# Patient Record
Sex: FEMALE | Race: BLACK OR AFRICAN AMERICAN | Employment: FULL TIME | URBAN - METROPOLITAN AREA
[De-identification: names, ages, dates, MRNs, and addresses within clinical notes are randomized per-mention and may not be internally consistent; named-entity substitution may affect disease eponyms.]

---

## 2022-10-21 ENCOUNTER — APPOINTMENT (OUTPATIENT)
Dept: GENERAL RADIOLOGY | Age: 42
End: 2022-10-21
Payer: COMMERCIAL

## 2022-10-21 ENCOUNTER — HOSPITAL ENCOUNTER (EMERGENCY)
Age: 42
Discharge: HOME | End: 2022-10-21
Attending: EMERGENCY MEDICINE
Payer: COMMERCIAL

## 2022-10-21 VITALS
HEART RATE: 110 BPM | OXYGEN SATURATION: 98 % | SYSTOLIC BLOOD PRESSURE: 165 MMHG | WEIGHT: 158 LBS | RESPIRATION RATE: 19 BRPM | TEMPERATURE: 98.1 F | DIASTOLIC BLOOD PRESSURE: 94 MMHG

## 2022-10-21 DIAGNOSIS — R05.9 COUGH, UNSPECIFIED TYPE: Primary | ICD-10-CM

## 2022-10-21 DIAGNOSIS — J40 BRONCHITIS: ICD-10-CM

## 2022-10-21 DIAGNOSIS — R68.89 FLU-LIKE SYMPTOMS: ICD-10-CM

## 2022-10-21 LAB
RAPID INFLUENZA  B AGN: NEGATIVE
RAPID INFLUENZA A AGN: NEGATIVE
SARS-COV-2, NAAT: NOT DETECTED
SOURCE: NORMAL

## 2022-10-21 PROCEDURE — 87804 INFLUENZA ASSAY W/OPTIC: CPT

## 2022-10-21 PROCEDURE — 71045 X-RAY EXAM CHEST 1 VIEW: CPT

## 2022-10-21 PROCEDURE — 99284 EMERGENCY DEPT VISIT MOD MDM: CPT

## 2022-10-21 PROCEDURE — 87635 SARS-COV-2 COVID-19 AMP PRB: CPT

## 2022-10-21 PROCEDURE — 6370000000 HC RX 637 (ALT 250 FOR IP): Performed by: EMERGENCY MEDICINE

## 2022-10-21 RX ORDER — GUAIFENESIN/DEXTROMETHORPHAN 100-10MG/5
5 SYRUP ORAL 4 TIMES DAILY PRN
Qty: 120 ML | Refills: 0 | Status: SHIPPED | OUTPATIENT
Start: 2022-10-21 | End: 2022-10-31

## 2022-10-21 RX ORDER — ACETAMINOPHEN 500 MG
1000 TABLET ORAL ONCE
Status: COMPLETED | OUTPATIENT
Start: 2022-10-21 | End: 2022-10-21

## 2022-10-21 RX ORDER — ACETAMINOPHEN 325 MG/1
650 TABLET ORAL EVERY 6 HOURS PRN
Qty: 120 TABLET | Refills: 3 | Status: SHIPPED | OUTPATIENT
Start: 2022-10-21

## 2022-10-21 RX ORDER — BENZONATATE 100 MG/1
100 CAPSULE ORAL 3 TIMES DAILY PRN
Qty: 10 CAPSULE | Refills: 0 | Status: SHIPPED | OUTPATIENT
Start: 2022-10-21 | End: 2022-10-28

## 2022-10-21 RX ORDER — ALBUTEROL SULFATE 90 UG/1
2 AEROSOL, METERED RESPIRATORY (INHALATION) EVERY 4 HOURS PRN
Qty: 18 G | Refills: 1 | Status: SHIPPED | OUTPATIENT
Start: 2022-10-21 | End: 2022-11-20

## 2022-10-21 RX ORDER — BENZONATATE 100 MG/1
100 CAPSULE ORAL ONCE
Status: COMPLETED | OUTPATIENT
Start: 2022-10-21 | End: 2022-10-21

## 2022-10-21 RX ORDER — AZITHROMYCIN 250 MG/1
TABLET, FILM COATED ORAL
Qty: 1 PACKET | Refills: 0 | Status: SHIPPED | OUTPATIENT
Start: 2022-10-21 | End: 2022-10-31

## 2022-10-21 RX ORDER — NAPROXEN 500 MG/1
500 TABLET ORAL 2 TIMES DAILY
Qty: 60 TABLET | Refills: 0 | Status: SHIPPED | OUTPATIENT
Start: 2022-10-21

## 2022-10-21 RX ADMIN — GUAIFENESIN 200 MG: 200 SOLUTION ORAL at 21:06

## 2022-10-21 RX ADMIN — BENZONATATE 100 MG: 100 CAPSULE ORAL at 21:06

## 2022-10-21 RX ADMIN — ACETAMINOPHEN 1000 MG: 500 TABLET ORAL at 21:06

## 2022-10-21 ASSESSMENT — ENCOUNTER SYMPTOMS
EYES NEGATIVE: 1
COUGH: 1
ALLERGIC/IMMUNOLOGIC NEGATIVE: 1
GASTROINTESTINAL NEGATIVE: 1

## 2022-10-21 ASSESSMENT — PAIN SCALES - GENERAL: PAINLEVEL_OUTOF10: 0

## 2022-10-21 NOTE — Clinical Note
Soledad Trujillo was seen and treated in our emergency department on 10/21/2022. She may return to work on 10/24/2022. If you have any questions or concerns, please don't hesitate to call.       Chandni Sagastume, DO

## 2022-10-22 NOTE — ED PROVIDER NOTES
[facility]      TRIAGE CHIEF COMPLAINT:   Concern For COVID-19      [place]:  Foster Dillon is a 37 y.o. female that presents with complaint of cough congestion fever fatigue malaise myalgias chills. 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 She recently got her flu vaccine also had the COVID-vaccine and booster. She states last night she worked till 7 AM this morning and went home went to bed woke up feeling bad. She has not taken a COVID test or flu test she was worried about these things she has been around people with these illnesses. She has had fever cough congestion fatigue malaise myalgia. No chest pain except when coughing. Denies any heart problems or lung problems. She has not taken medication for her cough or other symptoms whatsoever. She denies other travel denies any pregnancy or abdominal pain or swelling no other questions or concerns. Cough is productive of colored sputum. REVIEW OF SYSTEMS:  At least 10 systems reviewed and otherwise acutely negative except as in the [address] Review of Systems   Constitutional:  Positive for activity change, appetite change, chills and fever. HENT:  Positive for congestion. Eyes: Negative. Respiratory:  Positive for cough. Cardiovascular: Negative. Gastrointestinal: Negative. Endocrine: Negative. Genitourinary: Negative. Musculoskeletal:  Positive for arthralgias and myalgias. Skin: Negative. Allergic/Immunologic: Negative. Neurological: Negative. Hematological: Negative. Psychiatric/Behavioral: Negative. All other systems reviewed and are negative. No past medical history on file. No past surgical history on file. No family history on file.   Social History     Socioeconomic History    Marital status: Single     Spouse name: Not on file    Number of children: Not on file    Years of education: Not on file    Highest education level: Not on file   Occupational History    Not on file Tobacco Use    Smoking status: Not on file    Smokeless tobacco: Not on file   Substance and Sexual Activity    Alcohol use: Not on file    Drug use: Not on file    Sexual activity: Not on file   Other Topics Concern    Not on file   Social History Narrative    Not on file     Social Determinants of Health     Financial Resource Strain: Not on file   Food Insecurity: Not on file   Transportation Needs: Not on file   Physical Activity: Not on file   Stress: Not on file   Social Connections: Not on file   Intimate Partner Violence: Not on file   Housing Stability: Not on file     No current facility-administered medications for this encounter. Current Outpatient Medications   Medication Sig Dispense Refill    albuterol sulfate HFA (PROVENTIL HFA) 108 (90 Base) MCG/ACT inhaler Inhale 2 puffs into the lungs every 4 hours as needed for Wheezing or Shortness of Breath With spacer (and mask if indicated). Thanks. 18 g 1    naproxen (NAPROSYN) 500 MG tablet Take 1 tablet by mouth 2 times daily 60 tablet 0    acetaminophen (TYLENOL) 325 MG tablet Take 2 tablets by mouth every 6 hours as needed for Pain 120 tablet 3    guaiFENesin-dextromethorphan (ROBITUSSIN DM) 100-10 MG/5ML syrup Take 5 mLs by mouth 4 times daily as needed for Cough 120 mL 0    benzonatate (TESSALON PERLES) 100 MG capsule Take 1 capsule by mouth 3 times daily as needed for Cough 10 capsule 0    azithromycin (ZITHROMAX) 250 MG tablet Take 2 tablets (500 mg) on Day 1, followed by 1 tablet (250 mg) once daily on Days 2 through 5. 1 packet 0      Allergies   Allergen Reactions    Meclizine Hives    Iodinated Diagnostic Agents Hives     Reaction to CT scan dye x 1    Iodides Rash     No current facility-administered medications for this encounter.      Current Outpatient Medications   Medication Sig Dispense Refill    albuterol sulfate HFA (PROVENTIL HFA) 108 (90 Base) MCG/ACT inhaler Inhale 2 puffs into the lungs every 4 hours as needed for Wheezing or Shortness of Breath With spacer (and mask if indicated). Thanks. 18 g 1    naproxen (NAPROSYN) 500 MG tablet Take 1 tablet by mouth 2 times daily 60 tablet 0    acetaminophen (TYLENOL) 325 MG tablet Take 2 tablets by mouth every 6 hours as needed for Pain 120 tablet 3    guaiFENesin-dextromethorphan (ROBITUSSIN DM) 100-10 MG/5ML syrup Take 5 mLs by mouth 4 times daily as needed for Cough 120 mL 0    benzonatate (TESSALON PERLES) 100 MG capsule Take 1 capsule by mouth 3 times daily as needed for Cough 10 capsule 0    azithromycin (ZITHROMAX) 250 MG tablet Take 2 tablets (500 mg) on Day 1, followed by 1 tablet (250 mg) once daily on Days 2 through 5. 1 packet 0       Nursing Notes Reviewed    VITAL SIGNS:  ED Triage Vitals   Enc Vitals Group      BP       Pulse       Resp       Temp       Temp src       SpO2       Weight       Height       Head Circumference       Peak Flow       Pain Score       Pain Loc       Pain Edu? Excl. in [address]? PHYSICAL EXAM:  Physical Exam  Vitals and nursing note reviewed. Constitutional:       General: She is not in acute distress. Appearance: Normal appearance. She is well-developed and well-groomed. She is not ill-appearing, toxic-appearing or diaphoretic. HENT:      Head: Normocephalic and atraumatic. Right Ear: External ear normal.      Left Ear: External ear normal.      Nose: Congestion present. No rhinorrhea. Mouth/Throat:      Mouth: Mucous membranes are moist.      Pharynx: Oropharynx is clear. No oropharyngeal exudate or posterior oropharyngeal erythema. Eyes:      General: No scleral icterus. Right eye: No discharge. Left eye: No discharge. Extraocular Movements: Extraocular movements intact. Conjunctiva/sclera: Conjunctivae normal.      Pupils: Pupils are equal, round, and reactive to light. Neck:      Trachea: Phonation normal.   Cardiovascular:      Rate and Rhythm: Regular rhythm. Tachycardia present.       Pulses: Normal pulses. Heart sounds: Normal heart sounds. No murmur heard. No friction rub. No gallop. Pulmonary:      Effort: Pulmonary effort is normal. No respiratory distress. Breath sounds: Normal breath sounds. No stridor. No wheezing, rhonchi or rales. Abdominal:      General: Bowel sounds are normal. There is no distension. Palpations: Abdomen is soft. There is no mass. Tenderness: There is no abdominal tenderness. There is no guarding or rebound. Negative signs include Booth's sign, Rovsing's sign and McBurney's sign. Hernia: No hernia is present. Musculoskeletal:         General: No swelling, tenderness, deformity or signs of injury. Normal range of motion. Cervical back: Full passive range of motion without pain and normal range of motion. No rigidity. Right lower leg: No edema. Left lower leg: No edema. Skin:     General: Skin is warm. Coloration: Skin is not jaundiced or pale. Findings: No bruising, erythema, lesion or rash. Neurological:      General: No focal deficit present. Mental Status: She is alert and oriented to person, place, and time. Cranial Nerves: No cranial nerve deficit. Sensory: No sensory deficit. Motor: No weakness. Coordination: Coordination normal.   Psychiatric:         Mood and Affect: Mood normal.         Behavior: Behavior normal. Behavior is cooperative. Thought Content:  Thought content normal.         Judgment: Judgment normal.         I have reviewed andinterpreted all of the currently available lab results from this visit (if applicable):    Results for orders placed or performed during the hospital encounter of 10/21/22   COVID-19, Rapid    Specimen: Nasopharyngeal   Result Value Ref Range    Source NARES     SARS-CoV-2, NAAT NOT DETECTED NOT DETECTED   Rapid Flu Swab    Specimen: Nasopharyngeal   Result Value Ref Range    Rapid Influenza A Ag NEGATIVE NEGATIVE    Rapid Influenza B Ag NEGATIVE NEGATIVE        Radiographs (if obtained):  [] The following radiograph was interpreted by myself in the absence of a radiologist:  [x] Radiologist's Report Reviewed:    CXR    EKG (if obtained): (All EKG's are interpreted by myself in the absence of a cardiologist)    MDM:    Patient with complaint of fatigue malaise cough congestion flulike symptoms. Again symptoms started this morning when she woke up. 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 She got off at 7 AM went to bed when she got home woke up feeling bad. She came straight here. She has not had medication for symptoms. She is worried about COVID and flu. She has been vaccinated for both recently got vaccinated with the flu shot. COVID and flu are negative here. I did x-ray she has cough congestion exam otherwise she has no other symptoms or questions or concerns she appears nontoxic nonseptic. Breath sounds are clear. Vitals are stable. She has no abdominal pain no chest pain except when coughing. It sounds very respiratory nature. I do not think she needs labs or additional imaging or EKG this sounds very respiratory and viral in nature. Likely has bronchitis. X-ray performed negative discharged home with cough medicine Z-Akash given a work note return precautions and follow-up information. Doubt she has ACS DVT PE AAA pneumothorax. She is okay with plan otherwise stable. Rechecked again doing well. Work-up negative here so far she feels better on reexamination patient stable discharged home she is  Tachycardic on arrival likely due to cough congestion I do not think she has other acute illness patient stable discharge home with medications including Z-Akash okay with plan stable. Discharge.     CLINICAL IMPRESSION:  Final diagnoses:   Cough, unspecified type   Bronchitis   Flu-like symptoms       (Please note that portions of this note may have been completed with a voice recognition program. Efforts were made to edit the dictations but occasionally words aremis-transcribed.)    DISPOSITION REFERRAL (if applicable):  [facility]  [address]  [phone]    If symptoms worsen    ANA Zuniga NP  [address] [facility] [address]  [address]  [phone]          DISPOSITION MEDICATIONS (if applicable):  New Prescriptions    ACETAMINOPHEN (TYLENOL) 325 MG TABLET    Take 2 tablets by mouth every 6 hours as needed for Pain    ALBUTEROL SULFATE HFA (PROVENTIL HFA) 108 (90 BASE) MCG/ACT INHALER    Inhale 2 puffs into the lungs every 4 hours as needed for Wheezing or Shortness of Breath With spacer (and mask if indicated). Thanks. AZITHROMYCIN (ZITHROMAX) 250 MG TABLET    Take 2 tablets (500 mg) on Day 1, followed by 1 tablet (250 mg) once daily on Days 2 through 5.     BENZONATATE (TESSALON PERLES) 100 MG CAPSULE    Take 1 capsule by mouth 3 times daily as needed for Cough    GUAIFENESIN-DEXTROMETHORPHAN (ROBITUSSIN DM) 100-10 MG/5ML SYRUP    Take 5 mLs by mouth 4 times daily as needed for Cough    NAPROXEN (NAPROSYN) 500 MG TABLET    Take 1 tablet by mouth 2 times daily          Karie Deras, DO Karie Deras DO  10/21/22 2123